# Patient Record
Sex: FEMALE | Race: WHITE
[De-identification: names, ages, dates, MRNs, and addresses within clinical notes are randomized per-mention and may not be internally consistent; named-entity substitution may affect disease eponyms.]

---

## 2015-04-19 VITALS
SYSTOLIC BLOOD PRESSURE: 146 MMHG | SYSTOLIC BLOOD PRESSURE: 146 MMHG | DIASTOLIC BLOOD PRESSURE: 69 MMHG | DIASTOLIC BLOOD PRESSURE: 69 MMHG

## 2020-12-17 ENCOUNTER — HOSPITAL ENCOUNTER (OUTPATIENT)
Dept: HOSPITAL 63 - MAMMO | Age: 60
End: 2020-12-17
Attending: NURSE PRACTITIONER
Payer: COMMERCIAL

## 2020-12-17 DIAGNOSIS — Z12.31: Primary | ICD-10-CM

## 2020-12-17 DIAGNOSIS — N64.89: ICD-10-CM

## 2020-12-17 PROCEDURE — 77067 SCR MAMMO BI INCL CAD: CPT

## 2020-12-21 NOTE — RAD
DATE: 12/17/2020 8:59 AM



EXAM: DIGITAL SCREEN BILAT W/CAD



HISTORY:  Screening



COMPARISON: 11/22/2019, 12/5/2018



Bilateral full field craniocaudal and mediolateral oblique images were

obtained using digital technique.



This study was interpreted with the benefit of Computerized Aided Detection

(CAD).





FINDINGS:



Breast Density: SCATTERED  The breast parenchyma shows scattered

fibroglandular densities. Breast parenchyma level B





No suspicious masses, microcalcifications or architectural distortion is

present to suggest malignancy in either breast.





The visualized axillae are unremarkable. 



IMPRESSION: No mammographic evidence of malignancy. 



BI-RADS CATEGORY: 1 NEGATIVE



RECOMMENDED FOLLOW-UP: 12M 12 MONTH FOLLOW-UP Annual screening mammography is

recommended, unless clinically indicated sooner based on symptoms or change in

physical exam.



[employer] compliance statement: Patient information was entered into a reminder

system with a target due date for the next mammogram.



Mammography is a sensitive method for finding small breast cancers, but it

does not detect them all and is not a substitute for careful clinical

examination.  A negative mammogram does not negate a clinically suspicious

finding and should not result in delay in biopsying a clinically suspicious

abnormality.



"Our facility is accredited by the [employer] Mammography

Program."

## 2022-01-03 ENCOUNTER — HOSPITAL ENCOUNTER (OUTPATIENT)
Dept: HOSPITAL 63 - MAMMO | Age: 62
End: 2022-01-03
Attending: NURSE PRACTITIONER
Payer: COMMERCIAL

## 2022-01-03 DIAGNOSIS — Z12.31: Primary | ICD-10-CM

## 2022-01-03 PROCEDURE — 77067 SCR MAMMO BI INCL CAD: CPT

## 2022-01-03 NOTE — RAD
Digital Mammogram Bilateral



History:  Routine screening



Technique:   2-D digital CC and MLO views were obtained.   CAD - computer aided detection was utilize
d. 



Comparison:  Mammograms from 12/17/2020 and 11/22/2019.



Findings:

Breast Tissue Density B : There are scattered areas of fibroglandular density



There are no suspicious masses, malignant appearing calcifications, or areas of architectural distort
ion.



Impression:

No evidence of malignancy.



Assessment: BI-RADS Category 1:  Negative.



Recommendation: Routine screening mammograms.



The patient will receive a letter with the results in the mail. Patient information will be entered i
nto the mammography reminder system with a target recall date for the next mammogram. A reminder laura
er will be generated.



Electronically signed by: Dorothy Longoria MD (1/3/2022 10:45 AM) [id number]